# Patient Record
Sex: MALE | URBAN - METROPOLITAN AREA
[De-identification: names, ages, dates, MRNs, and addresses within clinical notes are randomized per-mention and may not be internally consistent; named-entity substitution may affect disease eponyms.]

---

## 2017-09-05 ENCOUNTER — OFFICE VISIT (OUTPATIENT)
Dept: FAMILY MEDICINE CLINIC | Facility: CLINIC | Age: 22
End: 2017-09-05

## 2017-09-05 ENCOUNTER — TELEPHONE (OUTPATIENT)
Dept: FAMILY MEDICINE CLINIC | Facility: CLINIC | Age: 22
End: 2017-09-05

## 2017-09-05 VITALS
HEIGHT: 65.75 IN | DIASTOLIC BLOOD PRESSURE: 74 MMHG | WEIGHT: 174.19 LBS | TEMPERATURE: 97 F | RESPIRATION RATE: 14 BRPM | HEART RATE: 56 BPM | BODY MASS INDEX: 28.33 KG/M2 | SYSTOLIC BLOOD PRESSURE: 120 MMHG

## 2017-09-05 DIAGNOSIS — Z98.890 HISTORY OF BACK SURGERY: Primary | ICD-10-CM

## 2017-09-05 PROCEDURE — 99212 OFFICE O/P EST SF 10 MIN: CPT | Performed by: FAMILY MEDICINE

## 2017-09-05 PROCEDURE — 99202 OFFICE O/P NEW SF 15 MIN: CPT | Performed by: FAMILY MEDICINE

## 2017-09-05 RX ORDER — NAPROXEN 500 MG/1
500 TABLET ORAL 2 TIMES DAILY WITH MEALS
Qty: 60 TABLET | Refills: 0 | Status: SHIPPED | OUTPATIENT
Start: 2017-09-05

## 2017-09-05 RX ORDER — HYDROCODONE BITARTRATE AND ACETAMINOPHEN 10; 325 MG/1; MG/1
1 TABLET ORAL EVERY 6 HOURS PRN
COMMUNITY
End: 2017-09-05 | Stop reason: CLARIF

## 2017-09-05 NOTE — TELEPHONE ENCOUNTER
Pt can be seen at [address] will need to contact Philly Pritchett directly for assistance in finding an in network provider. Thank you, [employer].

## 2017-09-05 NOTE — TELEPHONE ENCOUNTER
Pt called in stating he was seen today by Dr. Liana Beard and given a referral to see Dr. Leydi Mijares.  Pt is calling in stating Dr. Barton Lines office unfortunately does not accept his insurance.   Pt is looking to speak with Dr. Miriam Bradley to see if he can get a new referral to

## 2017-09-05 NOTE — PROGRESS NOTES
HPI:    Elbert Corral is a 25year old male presents to clinic as a new patient to establish care. Patient had a foraminal laminectomy in 2013, states that int he past 3 months, pain, numbness, and tingling symptoms have been returning.  Would like a re Affect normal.   Vitals reviewed.       ASSESSMENT/PLAN:   History of back surgery  (primary encounter diagnosis)  - Neurosurgery referral given to patient  - advised to stop taking Norco and to not take meds prescribed to anyone else   - Naproxen 500 mg BI

## 2017-09-05 NOTE — TELEPHONE ENCOUNTER
Patient called and stated need a new request for a referral to see    Rachael Reyes- Neuro Surgeon-    Requesting a call back once in place-

## 2017-09-05 NOTE — TELEPHONE ENCOUNTER
Spoke with pt states ambetter assistant in finding provider. Explain to pt to referrals are required for in network providers. Thank you, [employer].

## 2017-09-06 NOTE — TELEPHONE ENCOUNTER
Spoke with pt he informed me that he has scheduled an appointment with a in network provider for this month. Pt states that he was told he does not need a referral for in network providers.  States that he will double check and call back if in fact he does

## 2017-09-15 ENCOUNTER — TELEPHONE (OUTPATIENT)
Dept: FAMILY MEDICINE CLINIC | Facility: CLINIC | Age: 22
End: 2017-09-15

## 2017-09-15 NOTE — TELEPHONE ENCOUNTER
Pt. requesting to get a note stating that he is being referred to see a Neurosurgeon - Dr Maikel Myers - Please include Dx. Pt. Would like to p/up note from [facility]. Pt. States that no referral is needed just a note.

## (undated) NOTE — LETTER
9/16/2017              Karyna Pitch        [address] [facility] [address]         To Whom It May Concern,    Benjamin Beth is being referred to neurosurgery, Dr Joseph Engel. Diagnosis: History of back surgery (N13.086).   For additiona